# Patient Record
Sex: FEMALE
[De-identification: names, ages, dates, MRNs, and addresses within clinical notes are randomized per-mention and may not be internally consistent; named-entity substitution may affect disease eponyms.]

---

## 2019-10-15 PROBLEM — Z00.00 ENCOUNTER FOR PREVENTIVE HEALTH EXAMINATION: Status: ACTIVE | Noted: 2019-10-15

## 2019-11-25 ENCOUNTER — APPOINTMENT (OUTPATIENT)
Dept: COLORECTAL SURGERY | Facility: CLINIC | Age: 69
End: 2019-11-25
Payer: MEDICARE

## 2019-11-25 VITALS
TEMPERATURE: 97.6 F | BODY MASS INDEX: 19.13 KG/M2 | WEIGHT: 119 LBS | HEIGHT: 66 IN | SYSTOLIC BLOOD PRESSURE: 98 MMHG | HEART RATE: 83 BPM | DIASTOLIC BLOOD PRESSURE: 67 MMHG

## 2019-11-25 DIAGNOSIS — Z72.89 OTHER PROBLEMS RELATED TO LIFESTYLE: ICD-10-CM

## 2019-11-25 DIAGNOSIS — Z82.3 FAMILY HISTORY OF STROKE: ICD-10-CM

## 2019-11-25 DIAGNOSIS — K60.2 ANAL FISSURE, UNSPECIFIED: ICD-10-CM

## 2019-11-25 PROCEDURE — 46600 DIAGNOSTIC ANOSCOPY SPX: CPT

## 2019-11-25 PROCEDURE — 99202 OFFICE O/P NEW SF 15 MIN: CPT | Mod: 25

## 2019-11-25 RX ORDER — HYDROCORTISONE 10 MG/G
1 CREAM TOPICAL
Refills: 0 | Status: ACTIVE | COMMUNITY

## 2019-11-25 RX ORDER — COCOA BUTTER, PHENYLEPHRINE HYDROCHLORIDE 2211; 6.25 MG/1; MG/1
0.25-88.44 SUPPOSITORY RECTAL
Refills: 0 | Status: ACTIVE | COMMUNITY

## 2019-11-29 NOTE — ASSESSMENT
[FreeTextEntry1] : I suspect that her symptoms are related to the association and secondary effects from her prior to hemorrhoidectomy surgeries. It is likely that large stools create associated anal tears/fissures of the anal canal secondary to her prior surgery. I have therefore counseled her regarding the use of lubricant suppositories. In addition I recommend that she proceed with the use of daily stool softeners to help minimize stool bulk. Advised return in 4 weeks if symptoms are persistent.\par

## 2019-11-29 NOTE — PHYSICAL EXAM
[Normal] : was normal [Skin Tags] : residual hemorrhoidal skin tags were noted [None] : there was no rectal mass  [Abdomen Masses] : No abdominal masses [Abdomen Tenderness] : ~T No ~M abdominal tenderness [de-identified] : Mild tenderness-nonfocal [de-identified] : Minimal external tags [Excoriation] : no perianal excoriation [FreeTextEntry1] : A lighted anoscope was passed into the anal canal and the entire anal mucosal surface was inspected..  THe findings revealed minimal  internal hemorrhoids. No masses or lesions were identified.\par \par Small fissuring of the anal canal with passage of the adult endoscope.

## 2019-11-29 NOTE — CONSULT LETTER
[Dear  ___] : Dear  [unfilled], [Consult Letter:] : I had the pleasure of evaluating your patient, [unfilled]. [( Thank you for referring [unfilled] for consultation for _____ )] : Thank you for referring [unfilled] for consultation for [unfilled] [Please see my note below.] : Please see my note below. [Consult Closing:] : Thank you very much for allowing me to participate in the care of this patient.  If you have any questions, please do not hesitate to contact me. [Sincerely,] : Sincerely, [FreeTextEntry3] : DANYELL MCKENNA [FreeTextEntry2] : SEAM[facility] ESTRADA\par [address]  [address]  \[place] [address]\par